# Patient Record
Sex: MALE | Race: WHITE
[De-identification: names, ages, dates, MRNs, and addresses within clinical notes are randomized per-mention and may not be internally consistent; named-entity substitution may affect disease eponyms.]

---

## 2017-05-18 ENCOUNTER — HOSPITAL ENCOUNTER (EMERGENCY)
Dept: HOSPITAL 62 - ER | Age: 27
Discharge: LEFT BEFORE DISCHARGE | End: 2017-05-18
Payer: SELF-PAY

## 2017-05-18 VITALS — SYSTOLIC BLOOD PRESSURE: 146 MMHG | DIASTOLIC BLOOD PRESSURE: 96 MMHG

## 2017-05-18 DIAGNOSIS — Z53.21: Primary | ICD-10-CM

## 2017-08-11 ENCOUNTER — HOSPITAL ENCOUNTER (EMERGENCY)
Dept: HOSPITAL 62 - ER | Age: 27
Discharge: HOME | End: 2017-08-11
Payer: SELF-PAY

## 2017-08-11 VITALS — SYSTOLIC BLOOD PRESSURE: 110 MMHG | DIASTOLIC BLOOD PRESSURE: 43 MMHG

## 2017-08-11 DIAGNOSIS — R51: ICD-10-CM

## 2017-08-11 DIAGNOSIS — S02.40DA: Primary | ICD-10-CM

## 2017-08-11 DIAGNOSIS — M25.521: ICD-10-CM

## 2017-08-11 DIAGNOSIS — M25.512: ICD-10-CM

## 2017-08-11 DIAGNOSIS — R55: ICD-10-CM

## 2017-08-11 DIAGNOSIS — F17.200: ICD-10-CM

## 2017-08-11 DIAGNOSIS — S01.02XA: ICD-10-CM
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
## 2017-08-11 PROCEDURE — 99284 EMERGENCY DEPT VISIT MOD MDM: CPT

## 2017-08-11 PROCEDURE — 72125 CT NECK SPINE W/O DYE: CPT

## 2017-08-11 PROCEDURE — 70486 CT MAXILLOFACIAL W/O DYE: CPT

## 2017-08-11 PROCEDURE — 70450 CT HEAD/BRAIN W/O DYE: CPT
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
## 2019-09-09 ENCOUNTER — HOSPITAL ENCOUNTER (EMERGENCY)
Dept: HOSPITAL 62 - ER | Age: 29
Discharge: HOME | End: 2019-09-09
Payer: SELF-PAY

## 2019-09-09 VITALS — SYSTOLIC BLOOD PRESSURE: 133 MMHG | DIASTOLIC BLOOD PRESSURE: 69 MMHG

## 2019-09-09 DIAGNOSIS — R50.9: ICD-10-CM

## 2019-09-09 DIAGNOSIS — F17.200: ICD-10-CM

## 2019-09-09 DIAGNOSIS — J02.9: Primary | ICD-10-CM

## 2019-09-09 PROCEDURE — 99283 EMERGENCY DEPT VISIT LOW MDM: CPT

## 2019-09-09 PROCEDURE — 87880 STREP A ASSAY W/OPTIC: CPT

## 2019-09-09 PROCEDURE — 87070 CULTURE OTHR SPECIMN AEROBIC: CPT

## 2019-09-09 NOTE — ER DOCUMENT REPORT
HPI





- HPI


Patient complains to provider of: Sore throat


Time Seen by Provider: 09/09/19 13:42


Onset: Other - 3 days


Onset/Duration: Persistent


Quality of pain: Achy


Pain Level: 3


Context: 





Patient presents complaining of sore throat for the past 3 days.  Patient 

reports fever of 101 2 days ago.  Patient denies any cough.  Patient does report

some congestion symptoms.


Associated Symptoms: Fever, Rhinnorhea, Sore throat.  denies: Earache


Exacerbated by: Denies


Relieved by: Denies


Similar symptoms previously: Yes


Recently seen / treated by doctor: No





- ROS


ROS below otherwise negative: Yes


Systems Reviewed and Negative: Yes All other systems reviewed and negative





- CONSTITUTIONAL


Constitutional: REPORTS: Fever





- EENT


EENT: REPORTS: Sore Throat, Nasal Drainage-Clear.  DENIES: Ear Pain





- RESPIRATORY


Respiratory: DENIES: Coughing





- GASTROINTESTINAL


Gastrointestinal: DENIES: Nausea, Patient vomiting





- DERM


Skin Color: Normal


Skin Problems: None





Past Medical History





- General


Information source: Patient





- Social History


Smoking Status: Current Every Day Smoker


Smoking Education Provided: Yes


Frequency of alcohol use: None


Drug Abuse: None


Occupation: 


Family History: Reviewed & Not Pertinent


Patient has suicidal ideation: No


Patient has homicidal ideation: No





- Medical History


Medical History: Negative


Neurological Medical History: Reports: Hx Seizures - with drug use


Renal/ Medical History: Denies: Hx Peritoneal Dialysis


Surgical Hx: Negative





- Immunizations


Immunizations up to date: Yes


Hx Diphtheria, Pertussis, Tetanus Vaccination: No - unknown





[employer] Provider Document





- CONSTITUTIONAL


Agree With Documented VS: Yes


Exam Limitations: No Limitations


General Appearance: WD/WN, No Apparent Distress





- INFECTION CONTROL


TRAVEL OUTSIDE OF THE [place] IN LAST 30 DAYS: No





- HEENT


HEENT: Atraumatic, Normocephalic, Pharyngeal Tenderness, Pharyngeal Erythema.  

negative: Pharyngeal Exudate





- NECK


Neck: Normal Inspection, Supple.  negative: Lymphadenopathy-Left, 

Lymphadenopathy-Right





- RESPIRATORY


Respiratory: Breath Sounds Normal, No Respiratory Distress





- CARDIOVASCULAR


Cardiovascular: Regular Rate, Regular Rhythm





- BACK


Back: Normal Inspection





- MUSCULOSKELETAL/EXTREMETIES


Musculoskeletal/Extremeties: MAEW





- NEURO


Level of Consciousness: Awake, Alert, Appropriate


Motor/Sensory: No Motor Deficit





- DERM


Integumentary: Warm, Dry, No Rash





Course





- Re-evaluation


Re-evalutation: 





09/09/19 14:26


Patient strep test negative, no concern for PTA.  Patient able to manage oral 

secretions.  Patient nontoxic in appearance.





- Vital Signs


Vital signs: 


                                        











Temp Pulse Resp BP Pulse Ox


 


 98.7 F   110 H  16   141/78 H  97 


 


 09/09/19 11:40  09/09/19 11:40  09/09/19 11:40  09/09/19 11:40  09/09/19 11:40














- Laboratory


Laboratory results interpreted by me: 





09/09/19 14:26


                               Labs- Entire Visit











  09/09/19





  13:50


 


Group A Strep Rapid  NEGATIVE














Discharge





- Discharge


Clinical Impression: 


 Sore throat (viral)





Condition: Stable


Disposition: HOME, SELF-CARE


Instructions:  Acetaminophen, Fever ([facility]), Sore Throat ([facility])


Additional Instructions: 


Return immediately for any new or worsening symptoms





Followup with your primary care provider, call tomorrow to make a followup 

appointment





Throat culture is pending, we will call if you need any different treatment


Prescriptions: 


Naproxen [Naprosyn 250 Nmg Tablet] 1 tab PO BID #14 tablet


Forms:  Smoking Cessation Education, Return to Work


Referrals: 


[facility] [Provider Group] - Follow up as needed